# Patient Record
Sex: FEMALE | Race: WHITE | NOT HISPANIC OR LATINO | Employment: PART TIME | URBAN - NONMETROPOLITAN AREA
[De-identification: names, ages, dates, MRNs, and addresses within clinical notes are randomized per-mention and may not be internally consistent; named-entity substitution may affect disease eponyms.]

---

## 2017-10-04 ENCOUNTER — OFFICE VISIT (OUTPATIENT)
Dept: URGENT CARE | Facility: PHYSICIAN GROUP | Age: 37
End: 2017-10-04
Payer: COMMERCIAL

## 2017-10-04 VITALS
WEIGHT: 207 LBS | HEIGHT: 62 IN | OXYGEN SATURATION: 96 % | DIASTOLIC BLOOD PRESSURE: 70 MMHG | HEART RATE: 84 BPM | BODY MASS INDEX: 38.09 KG/M2 | TEMPERATURE: 98.8 F | RESPIRATION RATE: 18 BRPM | SYSTOLIC BLOOD PRESSURE: 118 MMHG

## 2017-10-04 DIAGNOSIS — H92.02 ACUTE OTALGIA, LEFT: ICD-10-CM

## 2017-10-04 PROCEDURE — 99213 OFFICE O/P EST LOW 20 MIN: CPT | Performed by: PHYSICIAN ASSISTANT

## 2017-10-04 ASSESSMENT — ENCOUNTER SYMPTOMS
SORE THROAT: 0
RHINORRHEA: 0
FEVER: 0
HEADACHES: 0
CHILLS: 0
COUGH: 0

## 2017-10-04 NOTE — PROGRESS NOTES
"Subjective:      Laurence Perez is a 37 y.o. female who presents with Otalgia (Pt states she woke up with L ear pain)            Left ear pain that started this morning. No fevers or other complaints.      Otalgia    There is pain in the left ear. This is a new problem. The current episode started today. The problem occurs constantly. The problem has been waxing and waning. There has been no fever. The pain is moderate. Pertinent negatives include no coughing, ear discharge, headaches, rhinorrhea or sore throat. She has tried NSAIDs for the symptoms. The treatment provided mild relief.       Review of Systems   Constitutional: Negative for chills and fever.   HENT: Positive for ear pain. Negative for congestion, ear discharge, rhinorrhea and sore throat.    Respiratory: Negative for cough.    Neurological: Negative for headaches.     Allergies:Amoxicillin    No current Epic-ordered outpatient prescriptions on file.     No current [facility]-ordered facility-administered medications on file.        No past medical history on file.    Social History   Substance Use Topics   • Smoking status: Never Smoker   • Smokeless tobacco: Never Used   • Alcohol use 1.0 oz/week     1 Glasses of wine, 1 Cans of beer per week      Comment: occ       No family status information on file.   History reviewed. No pertinent family history.       Objective:     /70   Pulse 84   Temp 37.1 °C (98.8 °F)   Resp 18   Ht 1.575 m (5' 2.01\")   Wt 93.9 kg (207 lb)   SpO2 96%   Breastfeeding? No   BMI 37.85 kg/m²      Physical Exam   Constitutional: She is oriented to person, place, and time. She appears well-developed and well-nourished. No distress.   HENT:   Head: Normocephalic and atraumatic.   Right Ear: External ear normal.   Left Ear: External ear normal.   Mouth/Throat: Oropharynx is clear and moist.   Canals unremarkable. Tympanic membrane slightly dull without erythema   Eyes: Right eye exhibits no discharge. Left eye exhibits " no discharge.   Neck: Normal range of motion. Neck supple.   Cardiovascular: Normal rate.    Pulmonary/Chest: Effort normal.   Neurological: She is alert and oriented to person, place, and time.   Skin: Skin is warm and dry. She is not diaphoretic.   Psychiatric: She has a normal mood and affect. Her behavior is normal. Judgment and thought content normal.   Nursing note and vitals reviewed.              Assessment/Plan:     1. Acute otalgia, left      Started this morning. Canals unremarkable. Tympanic membrane dull without erythema. Discussed symptomatic therapy. Follow-up with PCP as needed       [employer] Interactive Patient Education given:Otalgia    Please note that this dictation was created using voice recognition software. I have made every reasonable attempt to correct obvious errors, but I expect that there are errors of grammar and possibly content that I did not discover before finalizing the note.

## 2017-11-09 ENCOUNTER — OFFICE VISIT (OUTPATIENT)
Dept: URGENT CARE | Facility: PHYSICIAN GROUP | Age: 37
End: 2017-11-09
Payer: COMMERCIAL

## 2017-11-09 VITALS
HEIGHT: 62 IN | RESPIRATION RATE: 18 BRPM | OXYGEN SATURATION: 98 % | BODY MASS INDEX: 38.28 KG/M2 | WEIGHT: 208 LBS | SYSTOLIC BLOOD PRESSURE: 122 MMHG | HEART RATE: 114 BPM | DIASTOLIC BLOOD PRESSURE: 64 MMHG | TEMPERATURE: 98.8 F

## 2017-11-09 DIAGNOSIS — R05.9 COUGH: ICD-10-CM

## 2017-11-09 DIAGNOSIS — J06.9 UPPER RESPIRATORY TRACT INFECTION, UNSPECIFIED TYPE: ICD-10-CM

## 2017-11-09 PROCEDURE — 99214 OFFICE O/P EST MOD 30 MIN: CPT | Performed by: PHYSICIAN ASSISTANT

## 2017-11-09 RX ORDER — BENZONATATE 200 MG/1
200 CAPSULE ORAL 3 TIMES DAILY PRN
Qty: 30 CAP | Refills: 0 | Status: SHIPPED | OUTPATIENT
Start: 2017-11-09 | End: 2018-11-19

## 2017-11-09 ASSESSMENT — ENCOUNTER SYMPTOMS
COUGH: 1
CHILLS: 0
HEADACHES: 1
SHORTNESS OF BREATH: 0
SPUTUM PRODUCTION: 0
RHINORRHEA: 1
WHEEZING: 0
FEVER: 0

## 2017-11-09 NOTE — PROGRESS NOTES
"Subjective:      Laurence Perez is a 37 y.o. female who presents with Cough (Pt states she was seen 10/4/17 with little improvement)            Rhinorrhea, congestion, ear pressure/pain, and cough for the last couple of days. Symptoms fluctuating but not improving. No fevers or chills.      Cough   This is a new problem. The current episode started in the past 7 days. The problem has been waxing and waning. Associated symptoms include ear congestion, ear pain, headaches, nasal congestion and rhinorrhea. Pertinent negatives include no chills, fever, shortness of breath or wheezing. Nothing aggravates the symptoms. She has tried a beta-agonist inhaler for the symptoms. The treatment provided mild relief. Her past medical history is significant for bronchitis.       Review of Systems   Constitutional: Negative for chills and fever.   HENT: Positive for congestion, ear pain and rhinorrhea.    Respiratory: Positive for cough. Negative for sputum production, shortness of breath and wheezing.    Neurological: Positive for headaches.     Allergies:Amoxicillin    Current Outpatient Prescriptions Ordered in [facility]   Medication Sig Dispense Refill   • Fluticasone Propionate (FLONASE NA) Spray  in nose.     • Loratadine (CLARITIN PO) Take  by mouth.     • benzonatate (TESSALON) 200 MG capsule Take 1 Cap by mouth 3 times a day as needed for Cough. 30 Cap 0     No current Epic-ordered facility-administered medications on file.        History reviewed. No pertinent past medical history.    Social History   Substance Use Topics   • Smoking status: Never Smoker   • Smokeless tobacco: Never Used   • Alcohol use 1.0 oz/week     1 Glasses of wine, 1 Cans of beer per week      Comment: occ       No family status information on file.   History reviewed. No pertinent family history.       Objective:     /64   Pulse (!) 114   Temp 37.1 °C (98.8 °F)   Resp 18   Ht 1.575 m (5' 2.01\")   Wt 94.3 kg (208 lb)   SpO2 98%   " Breastfeeding? No   BMI 38.03 kg/m²      Physical Exam   Constitutional: She is oriented to person, place, and time. She appears well-developed and well-nourished. No distress.   HENT:   Head: Normocephalic and atraumatic.   Right Ear: External ear normal.   Left Ear: External ear normal.   Mouth/Throat: Oropharynx is clear and moist.   Canals unremarkable. Tympanic membranes dull without erythema. Nasal mucosal edema with clear nasal mucus   Eyes: Right eye exhibits no discharge. Left eye exhibits no discharge.   Neck: Normal range of motion. Neck supple.   Cardiovascular: Regular rhythm.    Tachycardic   Pulmonary/Chest: Effort normal and breath sounds normal. She has no wheezes. She has no rales.   Frequent nonproductive cough   Neurological: She is alert and oriented to person, place, and time.   Skin: Skin is warm and dry. She is not diaphoretic.   Psychiatric: She has a normal mood and affect. Her behavior is normal. Judgment and thought content normal.   Nursing note and vitals reviewed.              Assessment/Plan:     1. Upper respiratory tract infection, unspecified type      Ongoing for about a week. Likely viral. Given written instructions. Follow-up with PCP as needed   2. Cough  benzonatate (TESSALON) 200 MG capsule    Nonproductive. Lungs clear. Continue albuterol as needed. Start Tessalon. Follow-up with PCP       [employer] Interactive Patient Education given: DARINEL cough    Please note that this dictation was created using voice recognition software. I have made every reasonable attempt to correct obvious errors, but I expect that there are errors of grammar and possibly content that I did not discover before finalizing the note.

## 2017-11-09 NOTE — PATIENT INSTRUCTIONS
"Upper Respiratory Infection, Adult  Most upper respiratory infections (URIs) are a viral infection of the air passages leading to the lungs. A URI affects the nose, throat, and upper air passages. The most common type of URI is nasopharyngitis and is typically referred to as \"the common cold.\"  URIs run their course and usually go away on their own. Most of the time, a URI does not require medical attention, but sometimes a bacterial infection in the upper airways can follow a viral infection. This is called a secondary infection. Sinus and middle ear infections are common types of secondary upper respiratory infections.  Bacterial pneumonia can also complicate a URI. A URI can worsen asthma and chronic obstructive pulmonary disease (COPD). Sometimes, these complications can require emergency medical care and may be life threatening.   CAUSES  Almost all URIs are caused by viruses. A virus is a type of germ and can spread from one person to another.   RISKS FACTORS  You may be at risk for a URI if:   · You smoke.    · You have chronic heart or lung disease.  · You have a weakened defense (immune) system.    · You are very young or very old.    · You have nasal allergies or asthma.  · You work in crowded or poorly ventilated areas.  · You work in health care facilities or schools.  SIGNS AND SYMPTOMS   Symptoms typically develop 2-3 days after you come in contact with a cold virus. Most viral URIs last 7-10 days. However, viral URIs from the influenza virus (flu virus) can last 14-18 days and are typically more severe. Symptoms may include:   · Runny or stuffy (congested) nose.    · Sneezing.    · Cough.    · Sore throat.    · Headache.    · Fatigue.    · Fever.    · Loss of appetite.    · Pain in your forehead, behind your eyes, and over your cheekbones (sinus pain).  · Muscle aches.    DIAGNOSIS   Your health care provider may diagnose a URI by:  · Physical exam.  · Tests to check that your symptoms are not due to " another condition such as:  ¨ Strep throat.  ¨ Sinusitis.  ¨ Pneumonia.  ¨ Asthma.  TREATMENT   A URI goes away on its own with time. It cannot be cured with medicines, but medicines may be prescribed or recommended to relieve symptoms. Medicines may help:  · Reduce your fever.  · Reduce your cough.  · Relieve nasal congestion.  HOME CARE INSTRUCTIONS   · Take medicines only as directed by your health care provider.    · Gargle warm saltwater or take cough drops to comfort your throat as directed by your health care provider.  · Use a warm mist humidifier or inhale steam from a shower to increase air moisture. This may make it easier to breathe.  · Drink enough fluid to keep your urine clear or pale yellow.    · Eat soups and other clear broths and maintain good nutrition.    · Rest as needed.    · Return to work when your temperature has returned to normal or as your health care provider advises. You may need to stay home longer to avoid infecting others. You can also use a face mask and careful hand washing to prevent spread of the virus.  · Increase the usage of your inhaler if you have asthma.    · Do not use any tobacco products, including cigarettes, chewing tobacco, or electronic cigarettes. If you need help quitting, ask your health care provider.  PREVENTION   The best way to protect yourself from getting a cold is to practice good hygiene.   · Avoid oral or hand contact with people with cold symptoms.    · Wash your hands often if contact occurs.    There is no clear evidence that vitamin C, vitamin E, echinacea, or exercise reduces the chance of developing a cold. However, it is always recommended to get plenty of rest, exercise, and practice good nutrition.   SEEK MEDICAL CARE IF:   · You are getting worse rather than better.    · Your symptoms are not controlled by medicine.    · You have chills.  · You have worsening shortness of breath.  · You have brown or red mucus.  · You have yellow or brown nasal  discharge.  · You have pain in your face, especially when you bend forward.  · You have a fever.  · You have swollen neck glands.  · You have pain while swallowing.  · You have white areas in the back of your throat.  SEEK IMMEDIATE MEDICAL CARE IF:   · You have severe or persistent:  ¨ Headache.  ¨ Ear pain.  ¨ Sinus pain.  ¨ Chest pain.  · You have chronic lung disease and any of the following:  ¨ Wheezing.  ¨ Prolonged cough.  ¨ Coughing up blood.  ¨ A change in your usual mucus.  · You have a stiff neck.  · You have changes in your:  ¨ Vision.  ¨ Hearing.  ¨ Thinking.  ¨ Mood.  MAKE SURE YOU:   · Understand these instructions.  · Will watch your condition.  · Will get help right away if you are not doing well or get worse.     This information is not intended to replace advice given to you by your health care provider. Make sure you discuss any questions you have with your health care provider.     Document Released: 06/13/2002 Document Revised: 05/03/2016 Document Reviewed: 03/25/2015  [employer] Interactive Patient Education ©2016 [employer]  Cough, Adult   A cough is a reflex that helps clear your throat and airways. It can help heal the body or may be a reaction to an irritated airway. A cough may only last 2 or 3 weeks (acute) or may last more than 8 weeks (chronic).   CAUSES  Acute cough:  · Viral or bacterial infections.  Chronic cough:  · Infections.  · Allergies.  · Asthma.  · Post-nasal drip.  · Smoking.  · Heartburn or acid reflux.  · Some medicines.  · Chronic lung problems (COPD).  · Cancer.  SYMPTOMS   · Cough.  · Fever.  · Chest pain.  · Increased breathing rate.  · High-pitched whistling sound when breathing (wheezing).  · Colored mucus that you cough up (sputum).  TREATMENT   · A bacterial cough may be treated with antibiotic medicine.  · A viral cough must run its course and will not respond to antibiotics.  · Your caregiver may recommend other treatments if you have a chronic cough.  HOME  CARE INSTRUCTIONS   · Only take over-the-counter or prescription medicines for pain, discomfort, or fever as directed by your caregiver. Use cough suppressants only as directed by your caregiver.  · Use a cold steam vaporizer or humidifier in your bedroom or home to help loosen secretions.  · Sleep in a semi-upright position if your cough is worse at night.  · Rest as needed.  · Stop smoking if you smoke.  SEEK IMMEDIATE MEDICAL CARE IF:   · You have pus in your sputum.  · Your cough starts to worsen.  · You cannot control your cough with suppressants and are losing sleep.  · You begin coughing up blood.  · You have difficulty breathing.  · You develop pain which is getting worse or is uncontrolled with medicine.  · You have a fever.  MAKE SURE YOU:   · Understand these instructions.  · Will watch your condition.  · Will get help right away if you are not doing well or get worse.     This information is not intended to replace advice given to you by your health care provider. Make sure you discuss any questions you have with your health care provider.     Document Released: 06/15/2012 Document Revised: 03/11/2013 Document Reviewed: 02/24/2016  [employer] Interactive Patient Education ©2016 [employer]

## 2018-01-02 ENCOUNTER — OFFICE VISIT (OUTPATIENT)
Dept: URGENT CARE | Facility: PHYSICIAN GROUP | Age: 38
End: 2018-01-02
Payer: COMMERCIAL

## 2018-01-02 VITALS
RESPIRATION RATE: 14 BRPM | WEIGHT: 209 LBS | HEIGHT: 62 IN | OXYGEN SATURATION: 98 % | TEMPERATURE: 98 F | BODY MASS INDEX: 38.46 KG/M2 | SYSTOLIC BLOOD PRESSURE: 130 MMHG | DIASTOLIC BLOOD PRESSURE: 88 MMHG | HEART RATE: 84 BPM

## 2018-01-02 DIAGNOSIS — E66.9 OBESITY (BMI 30-39.9): ICD-10-CM

## 2018-01-02 DIAGNOSIS — B00.1 COLD SORE: Primary | ICD-10-CM

## 2018-01-02 PROCEDURE — 99214 OFFICE O/P EST MOD 30 MIN: CPT | Performed by: PHYSICIAN ASSISTANT

## 2018-01-02 RX ORDER — VALACYCLOVIR HYDROCHLORIDE 1 G/1
1000 TABLET, FILM COATED ORAL 2 TIMES DAILY
Qty: 2 TAB | Refills: 0 | Status: SHIPPED | OUTPATIENT
Start: 2018-01-02 | End: 2018-01-03

## 2018-01-02 NOTE — PROGRESS NOTES
"Chief Complaint   Patient presents with   • Mouth Lesions       HISTORY OF PRESENT ILLNESS: Patient is a 37 y.o. female who presents today because She has a cold sore on her upper lip on the right side. This is just in last couple days, she has had a few of them recently, but remembers getting them as a child. She has been using some over-the-counter ointment on it without improvement    Patient Active Problem List    Diagnosis Date Noted   • Obesity (BMI 30-39.9) 01/02/2018       Allergies:Amoxicillin    Current Outpatient Prescriptions Ordered in [facility]   Medication Sig Dispense Refill   • valacyclovir (VALTREX) 1 GM Tab Take 1 Tab by mouth 2 times a day for 1 day. 2 Tab 0   • Fluticasone Propionate (FLONASE NA) Spray  in nose.     • Loratadine (CLARITIN PO) Take  by mouth.     • benzonatate (TESSALON) 200 MG capsule Take 1 Cap by mouth 3 times a day as needed for Cough. 30 Cap 0     No current Epic-ordered facility-administered medications on file.        No past medical history on file.    Social History   Substance Use Topics   • Smoking status: Never Smoker   • Smokeless tobacco: Never Used   • Alcohol use 1.0 oz/week     1 Glasses of wine, 1 Cans of beer per week      Comment: occ       No family status information on file.   No family history on file.    ROS:  Review of Systems   Constitutional: Negative for fever, chills, weight loss and malaise/fatigue.   HENT: Negative for ear pain, nosebleeds, congestion, sore throat and neck pain.    Eyes: Negative for blurred vision.   Respiratory: Negative for cough, sputum production, shortness of breath and wheezing.    Cardiovascular: Negative for chest pain, palpitations, orthopnea and leg swelling.   Gastrointestinal: Negative for heartburn, nausea, vomiting and abdominal pain.       Exam:  Blood pressure 130/88, pulse 84, temperature 36.7 °C (98 °F), resp. rate 14, height 1.575 m (5' 2\"), weight 94.8 kg (209 lb), SpO2 98 %.  General:  Well nourished, well " developed female in NAD  Head:Normocephalic, atraumatic  Eyes: PERRLA, EOM within normal limits, no conjunctival injection, no scleral icterus, visual fields and acuity grossly intact.  Ears: Normal shape and symmetry, no tenderness, no discharge. External canals are without any significant edema or erythema. Tympanic membranes are without any inflammation, no effusion. Gross auditory acuity is intact  Nose: Symmetrical without tenderness, no discharge.  Mouth: reasonable hygiene, no erythema exudates or tonsillar enlargement.right upper vermilion border has a 1 cm diameter erythematous macular papular lesion consistent with a cold sore  Neck: no masses, range of motion within normal limits, no tracheal deviation. No obvious thyroid enlargement.  Extremities: no clubbing, cyanosis, or edema.    Please note that this dictation was created using voice recognition software. I have made every reasonable attempt to correct obvious errors, but I expect that there are errors of grammar and possibly content that I did not discover before finalizing the note.    Assessment/Plan:  1. Cold sore  valacyclovir (VALTREX) 1 GM Tab   2. Obesity (BMI 30-39.9)  Patient identified as having weight management issue.  Appropriate orders and counseling given.       Followup with primary care in the next 7-10 days if not significantly improving, return to the urgent care or go to the emergency room sooner for any worsening of symptoms.

## 2018-02-02 ENCOUNTER — HOSPITAL ENCOUNTER (EMERGENCY)
Facility: MEDICAL CENTER | Age: 38
End: 2018-02-02
Attending: EMERGENCY MEDICINE
Payer: COMMERCIAL

## 2018-02-02 ENCOUNTER — APPOINTMENT (OUTPATIENT)
Dept: RADIOLOGY | Facility: MEDICAL CENTER | Age: 38
End: 2018-02-02
Attending: EMERGENCY MEDICINE
Payer: COMMERCIAL

## 2018-02-02 VITALS
TEMPERATURE: 97.3 F | BODY MASS INDEX: 38.46 KG/M2 | WEIGHT: 209 LBS | HEIGHT: 62 IN | RESPIRATION RATE: 16 BRPM | OXYGEN SATURATION: 98 % | HEART RATE: 95 BPM | DIASTOLIC BLOOD PRESSURE: 72 MMHG | SYSTOLIC BLOOD PRESSURE: 125 MMHG

## 2018-02-02 DIAGNOSIS — R51.9 NONINTRACTABLE HEADACHE, UNSPECIFIED CHRONICITY PATTERN, UNSPECIFIED HEADACHE TYPE: ICD-10-CM

## 2018-02-02 LAB
ALBUMIN SERPL BCP-MCNC: 3.8 G/DL (ref 3.2–4.9)
ALBUMIN/GLOB SERPL: 1.2 G/DL
ALP SERPL-CCNC: 59 U/L (ref 30–99)
ALT SERPL-CCNC: 12 U/L (ref 2–50)
ANION GAP SERPL CALC-SCNC: 6 MMOL/L (ref 0–11.9)
APTT PPP: 32.1 SEC (ref 24.7–36)
AST SERPL-CCNC: 14 U/L (ref 12–45)
BASOPHILS # BLD AUTO: 0.7 % (ref 0–1.8)
BASOPHILS # BLD: 0.07 K/UL (ref 0–0.12)
BILIRUB SERPL-MCNC: 0.5 MG/DL (ref 0.1–1.5)
BUN SERPL-MCNC: 11 MG/DL (ref 8–22)
CALCIUM SERPL-MCNC: 8.8 MG/DL (ref 8.5–10.5)
CHLORIDE SERPL-SCNC: 110 MMOL/L (ref 96–112)
CO2 SERPL-SCNC: 22 MMOL/L (ref 20–33)
CREAT SERPL-MCNC: 0.8 MG/DL (ref 0.5–1.4)
EOSINOPHIL # BLD AUTO: 0.18 K/UL (ref 0–0.51)
EOSINOPHIL NFR BLD: 1.9 % (ref 0–6.9)
ERYTHROCYTE [DISTWIDTH] IN BLOOD BY AUTOMATED COUNT: 38.8 FL (ref 35.9–50)
GLOBULIN SER CALC-MCNC: 3.3 G/DL (ref 1.9–3.5)
GLUCOSE SERPL-MCNC: 96 MG/DL (ref 65–99)
HCT VFR BLD AUTO: 40.7 % (ref 37–47)
HGB BLD-MCNC: 13.9 G/DL (ref 12–16)
IMM GRANULOCYTES # BLD AUTO: 0.03 K/UL (ref 0–0.11)
IMM GRANULOCYTES NFR BLD AUTO: 0.3 % (ref 0–0.9)
INR PPP: 0.92 (ref 0.87–1.13)
LYMPHOCYTES # BLD AUTO: 3 K/UL (ref 1–4.8)
LYMPHOCYTES NFR BLD: 31.6 % (ref 22–41)
MCH RBC QN AUTO: 29.5 PG (ref 27–33)
MCHC RBC AUTO-ENTMCNC: 34.2 G/DL (ref 33.6–35)
MCV RBC AUTO: 86.4 FL (ref 81.4–97.8)
MONOCYTES # BLD AUTO: 0.51 K/UL (ref 0–0.85)
MONOCYTES NFR BLD AUTO: 5.4 % (ref 0–13.4)
NEUTROPHILS # BLD AUTO: 5.71 K/UL (ref 2–7.15)
NEUTROPHILS NFR BLD: 60.1 % (ref 44–72)
NRBC # BLD AUTO: 0 K/UL
NRBC BLD-RTO: 0 /100 WBC
PLATELET # BLD AUTO: 275 K/UL (ref 164–446)
PMV BLD AUTO: 10.7 FL (ref 9–12.9)
POTASSIUM SERPL-SCNC: 4.3 MMOL/L (ref 3.6–5.5)
PROT SERPL-MCNC: 7.1 G/DL (ref 6–8.2)
PROTHROMBIN TIME: 12.1 SEC (ref 12–14.6)
RBC # BLD AUTO: 4.71 M/UL (ref 4.2–5.4)
SODIUM SERPL-SCNC: 138 MMOL/L (ref 135–145)
WBC # BLD AUTO: 9.5 K/UL (ref 4.8–10.8)

## 2018-02-02 PROCEDURE — 85610 PROTHROMBIN TIME: CPT

## 2018-02-02 PROCEDURE — 70450 CT HEAD/BRAIN W/O DYE: CPT

## 2018-02-02 PROCEDURE — 85025 COMPLETE CBC W/AUTO DIFF WBC: CPT

## 2018-02-02 PROCEDURE — 96375 TX/PRO/DX INJ NEW DRUG ADDON: CPT

## 2018-02-02 PROCEDURE — 85730 THROMBOPLASTIN TIME PARTIAL: CPT

## 2018-02-02 PROCEDURE — 80053 COMPREHEN METABOLIC PANEL: CPT

## 2018-02-02 PROCEDURE — 99284 EMERGENCY DEPT VISIT MOD MDM: CPT

## 2018-02-02 PROCEDURE — 96374 THER/PROPH/DIAG INJ IV PUSH: CPT

## 2018-02-02 PROCEDURE — 700111 HCHG RX REV CODE 636 W/ 250 OVERRIDE (IP): Performed by: EMERGENCY MEDICINE

## 2018-02-02 RX ORDER — ONDANSETRON 2 MG/ML
4 INJECTION INTRAMUSCULAR; INTRAVENOUS ONCE
Status: COMPLETED | OUTPATIENT
Start: 2018-02-02 | End: 2018-02-02

## 2018-02-02 RX ORDER — HYDROMORPHONE HYDROCHLORIDE 2 MG/ML
1 INJECTION, SOLUTION INTRAMUSCULAR; INTRAVENOUS; SUBCUTANEOUS ONCE
Status: COMPLETED | OUTPATIENT
Start: 2018-02-02 | End: 2018-02-02

## 2018-02-02 RX ADMIN — HYDROMORPHONE HYDROCHLORIDE 1 MG: 2 INJECTION, SOLUTION INTRAMUSCULAR; INTRAVENOUS; SUBCUTANEOUS at 10:36

## 2018-02-02 RX ADMIN — ONDANSETRON 4 MG: 2 INJECTION INTRAMUSCULAR; INTRAVENOUS at 10:36

## 2018-02-02 ASSESSMENT — PAIN SCALES - GENERAL: PAINLEVEL_OUTOF10: 8

## 2018-02-02 NOTE — ED NOTES
"Pt brought back from [facility], ambulatory with steady gait.     Pt c/o HA x 4 days. 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 +photophobia.     A/o x4, speaking in full sentences.   "

## 2018-02-02 NOTE — ED TRIAGE NOTES
"PT ambulated to triage c/o headache x 4 days, pt has hx of migraines, pt reports seeing \"mirror like images off and on\"  Chief Complaint   Patient presents with   • Head Ache     hx of migraines      Blood pressure 132/74, pulse 99, temperature 36.1 °C (97 °F), resp. rate 18, height 1.575 m (5' 2\"), weight 94.8 kg (208 lb 15.9 oz), SpO2 98 %.      "

## 2018-02-02 NOTE — ED PROVIDER NOTES
"ED Provider Note    CHIEF COMPLAINT  Chief Complaint   Patient presents with   • Head Ache     hx of migraines        HPI  Laurence Perez is a 37 y.o. female who presents complaining of an intermittent bifrontal headache with photophobia over the past several days. She had an evaluation for migraines about 15 years ago. Evaluation was normal. She has not had any migraines recently. She does have some type of clotting disorder so she wants an evaluation. That certainly understandable. No stiff neck or fever. No weakness in the arms or legs. No speech difficulty. No memory loss.    REVIEW OF SYSTEMS  No chest pain or difficulty breathing. No abdominal pain.  ALL OTHER SYSTEMS NEGATIVE    ALLERGIES  Allergies   Allergen Reactions   • Amoxicillin        CURRENT MEDICATIONS  Home Medications     Reviewed by Navya Alvarado R.N. (Registered Nurse) on 02/02/18 at 0955  Med List Status: Partial   Medication Last Dose Status   benzonatate (TESSALON) 200 MG capsule  Active   Fluticasone Propionate (FLONASE NA)  Active   Loratadine (CLARITIN PO)  Active                PAST MEDICAL HISTORY  Negative    FAMILY HISTORY  Negative    SOCIAL HISTORY  Negative    PHYSICAL EXAM  GENERAL: Alert photophobic female adult  VITAL SIGNS: /74   Pulse 99   Temp 36.1 °C (97 °F)   Resp 18   Ht 1.575 m (5' 2\")   Wt 94.8 kg (208 lb 15.9 oz)   SpO2 98%   BMI 38.23 kg/m²   Constitutional: Alert healthy-appearing adult   HENT: Scalp is normal size and nontender. Ears are clear. Nose is clear. Throat is clear with no stridor no drooling no trismus. Teeth are all intact.  Eyes: Pupils equal round and reactive to light, extraocular motor fall. There is no scleral icterus.  Neck: Neck is supple and nontender. There is no meningismus. No adenitis. No thyromegaly.  Lymphatic: No adenopathy.   Cardiovascular: Heart regular rhythm without murmurs or gallops   Thorax & Lungs: No chest wall tenderness. Lungs are clear. Patient has good " breath sounds bilateral. No rales, no rhonchi, no wheezes.  Skin: Warm, pink, and dry with no erythema and no rash.   Back: Nontender, no midline bony tenderness, no flank tenderness.                                            Extremities: Full range of motion  No tenderness to palpation and no deformities noted. No calf or thigh swelling. No calf or thigh tenderness. No clinical DVT.  Neurologic: Alert & oriented . Cranial nerves are grossly intact as tested. Patient moves all 4 extremities well. Patient has good strong flexion and extension of the ankle joints knee joints hip joints and elbow joints. Sensation is normal and symmetrical in the upper and lower extremities.   Psychiatric: Patient is alert oriented coherent and rational.     RADIOLOGY/PROCEDURES  CT-HEAD W/O   Final Result      1.  No evidence of acute intracranial process.      2.  Chronic bilateral ethmoid sinusitis.            COURSE & MEDICAL DECISION MAKING  Patient is a history of migraines. But she has not had an evaluation and about 15 years. 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 Evaluation will be completed with lab and CAT scan.    Plan: #1 IV #2 intravenous Zofran and Dilaudid No. 3. CT of the head #4. Lab included CBC, CMP, pro time and PTT.    Laboratory and CT and reevaluation: CT of the head shows no acute change. Chronic sinusitis. CBC is normal with no anemia and no bandemia. Chemistry panel was normal with no renal or liver dysfunction. Pro time and PTT both normal. Stable for discharge.    Results for orders placed or performed during the hospital encounter of 02/02/18   CBC WITH DIFFERENTIAL   Result Value Ref Range    WBC 9.5 4.8 - 10.8 K/uL    RBC 4.71 4.20 - 5.40 M/uL    Hemoglobin 13.9 12.0 - 16.0 g/dL    Hematocrit 40.7 37.0 - 47.0 %    MCV 86.4 81.4 - 97.8 fL    MCH 29.5 27.0 - 33.0 pg    MCHC 34.2 33.6 - 35.0 g/dL    RDW 38.8 35.9 - 50.0 fL    Platelet Count 275 164 - 446 K/uL    MPV 10.7 9.0 - 12.9 fL     Neutrophils-Polys 60.10 44.00 - 72.00 %    Lymphocytes 31.60 22.00 - 41.00 %    Monocytes 5.40 0.00 - 13.40 %    Eosinophils 1.90 0.00 - 6.90 %    Basophils 0.70 0.00 - 1.80 %    Immature Granulocytes 0.30 0.00 - 0.90 %    Nucleated RBC 0.00 /100 WBC    Neutrophils (Absolute) 5.71 2.00 - 7.15 K/uL    Lymphs (Absolute) 3.00 1.00 - 4.80 K/uL    Monos (Absolute) 0.51 0.00 - 0.85 K/uL    Eos (Absolute) 0.18 0.00 - 0.51 K/uL    Baso (Absolute) 0.07 0.00 - 0.12 K/uL    Immature Granulocytes (abs) 0.03 0.00 - 0.11 K/uL    NRBC (Absolute) 0.00 K/uL   COMP METABOLIC PANEL   Result Value Ref Range    Sodium 138 135 - 145 mmol/L    Potassium 4.3 3.6 - 5.5 mmol/L    Chloride 110 96 - 112 mmol/L    Co2 22 20 - 33 mmol/L    Anion Gap 6.0 0.0 - 11.9    Glucose 96 65 - 99 mg/dL    Bun 11 8 - 22 mg/dL    Creatinine 0.80 0.50 - 1.40 mg/dL    Calcium 8.8 8.5 - 10.5 mg/dL    AST(SGOT) 14 12 - 45 U/L    ALT(SGPT) 12 2 - 50 U/L    Alkaline Phosphatase 59 30 - 99 U/L    Total Bilirubin 0.5 0.1 - 1.5 mg/dL    Albumin 3.8 3.2 - 4.9 g/dL    Total Protein 7.1 6.0 - 8.2 g/dL    Globulin 3.3 1.9 - 3.5 g/dL    A-G Ratio 1.2 g/dL   PROTHROMBIN TIME   Result Value Ref Range    PT 12.1 12.0 - 14.6 sec    INR 0.92 0.87 - 1.13   APTT   Result Value Ref Range    APTT 32.1 24.7 - 36.0 sec   ESTIMATED GFR   Result Value Ref Range    GFR If African American >60 >60 mL/min/1.73 m 2    GFR If Non African American >60 >60 mL/min/1.73 m 2      Patient has a long history of migraine headaches. Her evaluation here today is negative and normal. She responded well to medication here.    Home treatment: #1 and given a copy of all of her lab results and CAT scan reports. #2 show follow-up with her family physician #3 she stable on discharge. #4 she was given instructions on migraine headaches.  FINAL IMPRESSION  1. Headache  2. Migraine headache         Electronically signed by: Gary Gansert, 2/2/2018 10:20 AM

## 2018-02-02 NOTE — DISCHARGE INSTRUCTIONS
General Headache Without Cause  A general headache is pain or discomfort felt around the head or neck area. The cause may not be found.   HOME CARE   · Keep all doctor visits.  · Only take medicines as told by your doctor.  · Lie down in a dark, quiet room when you have a headache.  · Keep a journal to find out if certain things bring on headaches. For example, write down:  ¨ What you eat and drink.  ¨ How much sleep you get.  ¨ Any change to your diet or medicines.  · Relax by getting a massage or doing other relaxing activities.  · Put ice or heat packs on the head and neck area as told by your doctor.  · Lessen stress.  · Sit up straight. Do not tighten (tense) your muscles.  · Quit smoking if you smoke.  · Lessen how much alcohol you drink.  · Lessen how much caffeine you drink, or stop drinking caffeine.  · Eat and sleep on a regular schedule.  · Get 7 to 9 hours of sleep, or as told by your doctor.  · Keep lights dim if bright lights bother you or make your headaches worse.  GET HELP RIGHT AWAY IF:   · Your headache becomes really bad.  · You have a fever.  · You have a stiff neck.  · You have trouble seeing.  · Your muscles are weak, or you lose muscle control.  · You lose your balance or have trouble walking.  · You feel like you will pass out (faint), or you pass out.  · You have really bad symptoms that are different than your first symptoms.  · You have problems with the medicines given to you by your doctor.  · Your medicines do not work.  · Your headache feels different than the other headaches.  · You feel sick to your stomach (nauseous) or throw up (vomit).     This information is not intended to replace advice given to you by your health care provider. Make sure you discuss any questions you have with your health care provider.     Document Released: 09/26/2009 Document Revised: 05/03/2016 Document Reviewed: 12/07/2012  Elsevier Interactive Patient Education ©2016 [employer]      Migraine  Headache  A migraine headache is very bad, throbbing pain on one or both sides of your head. Talk to your doctor about what things may bring on (trigger) your migraine headaches.  HOME CARE  · Only take medicines as told by your doctor.  · Lie down in a dark, quiet room when you have a migraine.  · Keep a journal to find out if certain things bring on migraine headaches. For example, write down:  ¨ What you eat and drink.  ¨ How much sleep you get.  ¨ Any change to your diet or medicines.  · Lessen how much alcohol you drink.  · Quit smoking if you smoke.  · Get enough sleep.  · Lessen any stress in your life.  · Keep lights dim if bright lights bother you or make your migraines worse.  GET HELP RIGHT AWAY IF:   · Your migraine becomes really bad.  · You have a fever.  · You have a stiff neck.  · You have trouble seeing.  · Your muscles are weak, or you lose muscle control.  · You lose your balance or have trouble walking.  · You feel like you will pass out (faint), or you pass out.  · You have really bad symptoms that are different than your first symptoms.  MAKE SURE YOU:   · Understand these instructions.  · Will watch your condition.  · Will get help right away if you are not doing well or get worse.     This information is not intended to replace advice given to you by your health care provider. Make sure you discuss any questions you have with your health care provider.     Document Released: 09/26/2009 Document Revised: 03/11/2013 Document Reviewed: 08/25/2014  [employer] Interactive Patient Education ©2016 [employer]

## 2018-05-25 ENCOUNTER — APPOINTMENT (OUTPATIENT)
Dept: RADIOLOGY | Facility: IMAGING CENTER | Age: 38
End: 2018-05-25
Attending: PHYSICIAN ASSISTANT
Payer: COMMERCIAL

## 2018-05-25 ENCOUNTER — OFFICE VISIT (OUTPATIENT)
Dept: URGENT CARE | Facility: PHYSICIAN GROUP | Age: 38
End: 2018-05-25
Payer: COMMERCIAL

## 2018-05-25 VITALS
TEMPERATURE: 97.9 F | HEIGHT: 62 IN | BODY MASS INDEX: 37.54 KG/M2 | SYSTOLIC BLOOD PRESSURE: 124 MMHG | HEART RATE: 78 BPM | OXYGEN SATURATION: 98 % | RESPIRATION RATE: 16 BRPM | DIASTOLIC BLOOD PRESSURE: 78 MMHG | WEIGHT: 204 LBS

## 2018-05-25 DIAGNOSIS — M77.31 HEEL SPUR, RIGHT: ICD-10-CM

## 2018-05-25 DIAGNOSIS — M79.671 RIGHT FOOT PAIN: ICD-10-CM

## 2018-05-25 PROCEDURE — 73630 X-RAY EXAM OF FOOT: CPT | Mod: TC,FY,RT | Performed by: PHYSICIAN ASSISTANT

## 2018-05-25 PROCEDURE — 99214 OFFICE O/P EST MOD 30 MIN: CPT | Performed by: PHYSICIAN ASSISTANT

## 2018-05-25 RX ORDER — METHYLPREDNISOLONE 4 MG/1
TABLET ORAL
Qty: 21 TAB | Refills: 0 | Status: SHIPPED | OUTPATIENT
Start: 2018-05-25 | End: 2018-11-19

## 2018-05-25 ASSESSMENT — ENCOUNTER SYMPTOMS
SENSORY CHANGE: 0
NAUSEA: 0
WEAKNESS: 0
ARTHRALGIAS: 1
MYALGIAS: 1
NUMBNESS: 0
TINGLING: 0
CHILLS: 0
JOINT SWELLING: 1
FEVER: 0

## 2018-05-25 ASSESSMENT — PAIN SCALES - GENERAL: PAINLEVEL: 8=MODERATE-SEVERE PAIN

## 2018-05-26 NOTE — PROGRESS NOTES
"Subjective:      Laurence Perez is a 37 y.o. female who presents with Foot Pain (more in her heel its been over a week but today is worse)            Foot Problem   This is a new problem. Episode onset: 1 week  The problem occurs constantly. The problem has been waxing and waning (worsening today after landing on her foot after standing on her tip toes). Associated symptoms include arthralgias (right foot pain ), joint swelling and myalgias. Pertinent negatives include no chills, fever, nausea, numbness, rash or weakness. Exacerbated by: bearing weight. She has tried acetaminophen for the symptoms.       History reviewed. No pertinent past medical history.    History reviewed. No pertinent surgical history.    History reviewed. No pertinent family history.    Allergies   Allergen Reactions   • Amoxicillin        Medications, Allergies, and current problem list reviewed today in Epic    Review of Systems   Constitutional: Negative for chills and fever.   Gastrointestinal: Negative for nausea.   Musculoskeletal: Positive for arthralgias (right foot pain ), joint pain (right foot pain ), joint swelling and myalgias.   Skin: Negative for rash.   Neurological: Negative for tingling, sensory change, weakness and numbness.     All other systems reviewed and are negative.        Objective:     /78   Pulse 78   Temp 36.6 °C (97.9 °F)   Resp 16   Ht 1.575 m (5' 2\")   Wt 92.5 kg (204 lb)   SpO2 98%   BMI 37.31 kg/m²      Physical Exam   Constitutional: She is oriented to person, place, and time. She appears well-developed and well-nourished. No distress.   Pulmonary/Chest: Effort normal. No respiratory distress.   Musculoskeletal:        Feet:    Neurological: She is alert and oriented to person, place, and time.   Psychiatric: She has a normal mood and affect. Her behavior is normal. Judgment and thought content normal.                 RAD:  FINDINGS:  No acute fracture, malalignment, or soft tissue " abnormality. There is a bifid medial sesamoid bone. There is a tiny enthesophyte projecting from the plantar surface of calcaneus    Assessment/Plan:     1. Right foot pain  DX-FOOT-COMPLETE 3+ RIGHT    MethylPREDNISolone (MEDROL DOSEPAK) 4 MG Tablet Therapy Pack   2. Heel spur, right  MethylPREDNISolone (MEDROL DOSEPAK) 4 MG Tablet Therapy Pack       - DX-FOOT-COMPLETE 3+ RIGHT; Future      Current Outpatient Prescriptions:   •  MethylPREDNISolone (MEDROL DOSEPAK) 4 MG Tablet Therapy Pack, Take as directed on package. 1 pack., Disp: 21 Tab, Rfl: 0    Encouraged RICE.  Encouraged comfortable shoes.  Patient refuses Boot and podiatry referrla.     Differential diagnoses, Supportive care, and indications for immediate follow-up discussed with patient.   Instructed to return to clinic or nearest emergency department for any change in condition, further concerns, or worsening of symptoms.    The patient demonstrated a good understanding and agreed with the treatment plan.    Luisa Gaston P.A.-C.

## 2018-11-19 ENCOUNTER — OFFICE VISIT (OUTPATIENT)
Dept: URGENT CARE | Facility: PHYSICIAN GROUP | Age: 38
End: 2018-11-19
Payer: COMMERCIAL

## 2018-11-19 VITALS
TEMPERATURE: 98.2 F | HEART RATE: 96 BPM | DIASTOLIC BLOOD PRESSURE: 86 MMHG | RESPIRATION RATE: 16 BRPM | BODY MASS INDEX: 38.64 KG/M2 | OXYGEN SATURATION: 99 % | SYSTOLIC BLOOD PRESSURE: 122 MMHG | HEIGHT: 62 IN | WEIGHT: 210 LBS

## 2018-11-19 DIAGNOSIS — H69.91 DYSFUNCTION OF RIGHT EUSTACHIAN TUBE: ICD-10-CM

## 2018-11-19 PROCEDURE — 99213 OFFICE O/P EST LOW 20 MIN: CPT | Performed by: FAMILY MEDICINE

## 2018-11-19 RX ORDER — OXYMETAZOLINE HYDROCHLORIDE 0.05 G/100ML
2 SPRAY NASAL 2 TIMES DAILY
Qty: 1 BOTTLE | Refills: 0 | Status: SHIPPED | OUTPATIENT
Start: 2018-11-19

## 2018-11-19 RX ORDER — FLUTICASONE PROPIONATE 50 MCG
1 SPRAY, SUSPENSION (ML) NASAL DAILY
Qty: 16 G | Refills: 0 | Status: SHIPPED | OUTPATIENT
Start: 2018-11-19

## 2018-11-19 ASSESSMENT — ENCOUNTER SYMPTOMS
COUGH: 0
FEVER: 0
DIARRHEA: 0
SHORTNESS OF BREATH: 0
HEADACHES: 0
ABDOMINAL PAIN: 0
NAUSEA: 0
SORE THROAT: 0
VOMITING: 0
CHILLS: 0

## 2018-11-19 NOTE — PROGRESS NOTES
"Subjective:     Laurence Perez is a 38 y.o. female who presents for Sinus Problem    HPI  Pt presents for evaluation of a new problem  Pt with sinus pain for the past week  Right ear is starting to become painful last 2 days and concerned that she may have developed an ear infection   Uses Flonase daily and also takes Claritin daily  Ear pain is sharp, constantly present, and worsening  Denies associated decreased hearing acuity, tinnitus, or ear drainage  Has been taking Sudafed and Tylenol with some improvement   Denies sick contacts with similar symptoms    Review of Systems   Constitutional: Negative for chills and fever.   HENT: Positive for congestion and ear pain. Negative for sore throat.    Respiratory: Negative for cough and shortness of breath.    Cardiovascular: Negative for chest pain.   Gastrointestinal: Negative for abdominal pain, diarrhea, nausea and vomiting.   Skin: Negative for rash.   Neurological: Negative for headaches.     PMH:  has no past medical history on file.  MEDS:   Current Outpatient Prescriptions:   •  MethylPREDNISolone (MEDROL DOSEPAK) 4 MG Tablet Therapy Pack, Take as directed on package. 1 pack., Disp: 21 Tab, Rfl: 0  •  Fluticasone Propionate (FLONASE NA), Spray  in nose., Disp: , Rfl:   •  Loratadine (CLARITIN PO), Take  by mouth., Disp: , Rfl:   •  benzonatate (TESSALON) 200 MG capsule, Take 1 Cap by mouth 3 times a day as needed for Cough., Disp: 30 Cap, Rfl: 0  ALLERGIES:   Allergies   Allergen Reactions   • Amoxicillin      SURGHX: No past surgical history on file.  SOCHX:  reports that she has never smoked. She has never used smokeless tobacco. She reports that she drinks about 1.0 oz of alcohol per week . She reports that she does not use drugs.     Objective:   /86 (BP Location: Right arm, Patient Position: Sitting, BP Cuff Size: Large adult)   Pulse 96   Temp 36.8 °C (98.2 °F) (Temporal)   Resp 16   Ht 1.575 m (5' 2\")   Wt 95.3 kg (210 lb)   SpO2 99%   " BMI 38.41 kg/m²     Physical Exam   Constitutional: She appears well-developed and well-nourished. No distress.   HENT:   Head: Normocephalic and atraumatic.   Right Ear: External ear and ear canal normal.   Left Ear: External ear and ear canal normal.   Nose: Mucosal edema and rhinorrhea present.   Mouth/Throat: Uvula is midline, oropharynx is clear and moist and mucous membranes are normal.   Bilateral tympanic membranes with clear effusions   Eyes: Conjunctivae and EOM are normal. Right eye exhibits no discharge. Left eye exhibits no discharge. No scleral icterus.   Neck: Normal range of motion. No tracheal deviation present.   Cardiovascular: Normal rate, regular rhythm and normal heart sounds.    Pulmonary/Chest: Effort normal and breath sounds normal. No respiratory distress. She has no wheezes. She has no rales.   Neurological: She is alert. Coordination normal.   Skin: Skin is warm and dry. No rash noted. She is not diaphoretic.   Psychiatric: She has a normal mood and affect.          Assessment/Plan:   Assessment    1. Dysfunction of right eustachian tube  Patient with ear pain secondary to eustachian tube dysfunction.  Tympanic membrane not consistent with otitis media.  Already using Flonase daily as well as Claritin.  Advised adding Afrin for a few days and also using alkalol nasal spray for moisture.  Antibiotics not indicated at this time.  Reviewed other supportive care measures as well as expected course of illness.  Will follow-up on an as-needed basis.  - fluticasone (FLONASE) 50 MCG/ACT nasal spray; Spray 1 Spray in nose every day.  Dispense: 16 g; Refill: 0  - oxymetazoline (AFRIN) 0.05 % Solution; Spray 2 Sprays in nose 2 times a day.  Dispense: 1 Bottle; Refill: 0    F/U PRN

## 2019-03-01 ENCOUNTER — OFFICE VISIT (OUTPATIENT)
Dept: URGENT CARE | Facility: PHYSICIAN GROUP | Age: 39
End: 2019-03-01
Payer: COMMERCIAL

## 2019-03-01 VITALS
HEIGHT: 62 IN | DIASTOLIC BLOOD PRESSURE: 78 MMHG | OXYGEN SATURATION: 98 % | WEIGHT: 215 LBS | SYSTOLIC BLOOD PRESSURE: 118 MMHG | BODY MASS INDEX: 39.56 KG/M2 | RESPIRATION RATE: 14 BRPM | HEART RATE: 100 BPM | TEMPERATURE: 98.5 F

## 2019-03-01 DIAGNOSIS — L02.411 ABSCESS OF AXILLA, RIGHT: ICD-10-CM

## 2019-03-01 PROCEDURE — 99214 OFFICE O/P EST MOD 30 MIN: CPT | Performed by: PHYSICIAN ASSISTANT

## 2019-03-01 RX ORDER — SULFAMETHOXAZOLE AND TRIMETHOPRIM 800; 160 MG/1; MG/1
1 TABLET ORAL 2 TIMES DAILY
Qty: 20 TAB | Refills: 0 | Status: SHIPPED | OUTPATIENT
Start: 2019-03-01 | End: 2019-03-11

## 2019-03-01 NOTE — PROGRESS NOTES
Chief Complaint   Patient presents with   • Other     abcess       HISTORY OF PRESENT ILLNESS: Patient is a 38 y.o. female who presents today because she has a 2-3-day history of enlarging red tender nodule in her right axillary region.  She has not been applying any medication to the area or taking any medication for it but it is getting larger.    Patient Active Problem List    Diagnosis Date Noted   • Obesity (BMI 30-39.9) 01/02/2018       Allergies:Amoxicillin    Current Outpatient Prescriptions Ordered in [facility]   Medication Sig Dispense Refill   • sulfamethoxazole-trimethoprim (BACTRIM DS) 800-160 MG tablet Take 1 Tab by mouth 2 times a day for 10 days. 20 Tab 0   • fluticasone (FLONASE) 50 MCG/ACT nasal spray Spray 1 Spray in nose every day. 16 g 0   • oxymetazoline (AFRIN) 0.05 % Solution Spray 2 Sprays in nose 2 times a day. 1 Bottle 0   • Loratadine (CLARITIN PO) Take  by mouth.       No current Epic-ordered facility-administered medications on file.        No past medical history on file.    Social History   Substance Use Topics   • Smoking status: Never Smoker   • Smokeless tobacco: Never Used   • Alcohol use 1.0 oz/week     1 Glasses of wine, 1 Cans of beer per week      Comment: occ       No family status information on file.   No family history on file.    ROS:  Review of Systems   Constitutional: Negative for fever, chills, weight loss and malaise/fatigue.   HENT: Negative for ear pain, nosebleeds, congestion, sore throat and neck pain.    Eyes: Negative for blurred vision.   Respiratory: Negative for cough, sputum production, shortness of breath and wheezing.    Cardiovascular: Negative for chest pain, palpitations, orthopnea and leg swelling.   Gastrointestinal: Negative for heartburn, nausea, vomiting and abdominal pain.   Genitourinary: Negative for dysuria, urgency and frequency.     Exam:  Blood pressure 118/78, pulse 100, temperature 36.9 °C (98.5 °F), temperature source Temporal, resp. rate 14,  "height 1.575 m (5' 2\"), weight 97.5 kg (215 lb), SpO2 98 %, not currently breastfeeding.  General:  Well nourished, well developed female in NAD  Head:Normocephalic, atraumatic  Eyes: PERRLA, EOM within normal limits, no conjunctival injection, no scleral icterus, visual fields and acuity grossly intact.  Nose: Symmetrical without tenderness, no discharge.  Mouth: reasonable hygiene, no erythema exudates or tonsillar enlargement.  Neck: no masses, range of motion within normal limits, no tracheal deviation. No obvious thyroid enlargement.  Pulmonary: chest is symmetrical with respiration, no wheezes, crackles, or rhonchi.  Cardiovascular: regular rate and rhythm without murmurs, rubs, or gallops.  Extremities: no clubbing, cyanosis, or edema.  Skin: In her right axillary region there is a 2 cm diameter erythematous indurated nodule without fluctuance.  No drainage currently.      Please note that this dictation was created using voice recognition software. I have made every reasonable attempt to correct obvious errors, but I expect that there are errors of grammar and possibly content that I did not discover before finalizing the note.    Assessment/Plan:  1. Abscess of axilla, right  sulfamethoxazole-trimethoprim (BACTRIM DS) 800-160 MG tablet   Apply warm packs to the area, use Tylenol or ibuprofen as tolerated    Followup with primary care in the next 7-10 days if not significantly improving, return to the urgent care or go to the emergency room sooner for any worsening of symptoms.       "

## 2019-10-30 ENCOUNTER — OFFICE VISIT (OUTPATIENT)
Dept: URGENT CARE | Facility: PHYSICIAN GROUP | Age: 39
End: 2019-10-30
Payer: COMMERCIAL

## 2019-10-30 ENCOUNTER — APPOINTMENT (OUTPATIENT)
Dept: MEDICAL GROUP | Facility: PHYSICIAN GROUP | Age: 39
End: 2019-10-30
Payer: COMMERCIAL

## 2019-10-30 VITALS
BODY MASS INDEX: 39.01 KG/M2 | SYSTOLIC BLOOD PRESSURE: 118 MMHG | RESPIRATION RATE: 14 BRPM | HEART RATE: 104 BPM | OXYGEN SATURATION: 96 % | TEMPERATURE: 97.4 F | WEIGHT: 212 LBS | HEIGHT: 62 IN | DIASTOLIC BLOOD PRESSURE: 86 MMHG

## 2019-10-30 DIAGNOSIS — J01.90 ACUTE BACTERIAL SINUSITIS: ICD-10-CM

## 2019-10-30 DIAGNOSIS — M79.10 MYALGIA: ICD-10-CM

## 2019-10-30 DIAGNOSIS — B96.89 ACUTE BACTERIAL SINUSITIS: ICD-10-CM

## 2019-10-30 LAB
FLUAV+FLUBV AG SPEC QL IA: NEGATIVE
INT CON NEG: NEGATIVE
INT CON POS: POSITIVE

## 2019-10-30 PROCEDURE — 87804 INFLUENZA ASSAY W/OPTIC: CPT | Performed by: PHYSICIAN ASSISTANT

## 2019-10-30 PROCEDURE — 99214 OFFICE O/P EST MOD 30 MIN: CPT | Performed by: PHYSICIAN ASSISTANT

## 2019-10-30 RX ORDER — DOXYCYCLINE HYCLATE 100 MG
100 TABLET ORAL 2 TIMES DAILY
Qty: 20 TAB | Refills: 0 | Status: SHIPPED | OUTPATIENT
Start: 2019-10-30 | End: 2019-11-09

## 2019-10-30 NOTE — PROGRESS NOTES
Chief Complaint   Patient presents with   • Pharyngitis   • Fever       HISTORY OF PRESENT ILLNESS: Patient is a 39 y.o. female who presents today because she has an illness that started a little over week ago.  She has a sore throat and a mild cough for about a week, her sore throat has essentially resolved but over the last 1 to 2 days she has had significant myalgias, headaches behind her eyes and in the back of her head and general sick feeling.  She has been taking some over-the-counter Sudafed and Mucinex without improvement.  She reports fevers and she took Tylenol 2 hours ago because she was running a fever this morning.    Patient Active Problem List    Diagnosis Date Noted   • Obesity (BMI 30-39.9) 01/02/2018       Allergies:Amoxicillin    Current Outpatient Medications Ordered in Epic   Medication Sig Dispense Refill   • doxycycline (VIBRAMYCIN) 100 MG Tab Take 1 Tab by mouth 2 times a day for 10 days. 20 Tab 0   • fluticasone (FLONASE) 50 MCG/ACT nasal spray Spray 1 Spray in nose every day. 16 g 0   • oxymetazoline (AFRIN) 0.05 % Solution Spray 2 Sprays in nose 2 times a day. 1 Bottle 0   • Loratadine (CLARITIN PO) Take  by mouth.       No current Epic-ordered facility-administered medications on file.        No past medical history on file.    Social History     Tobacco Use   • Smoking status: Never Smoker   • Smokeless tobacco: Never Used   Substance Use Topics   • Alcohol use: Yes     Alcohol/week: 1.0 oz     Types: 1 Glasses of wine, 1 Cans of beer per week     Comment: occ   • Drug use: No       No family status information on file.   No family history on file.    ROS:  Review of Systems   Constitutional: Positive for fever, chills, myalgias and malaise/fatigue.   HENT: Negative for ear pain, nosebleeds, positive for nasal sinus congestion, resolved sore throat and no neck pain.    Eyes: Negative for blurred vision.   Respiratory: Positive for dry mild cough, no sputum production, shortness of  "breath and wheezing.    Cardiovascular: Negative for chest pain, palpitations, orthopnea and leg swelling.   Gastrointestinal: Negative for heartburn, nausea, vomiting and abdominal pain.   Genitourinary: Negative for dysuria, urgency and frequency.     Exam:  /86 (BP Location: Right arm, Patient Position: Sitting, BP Cuff Size: Large adult)   Pulse (!) 104   Temp 36.3 °C (97.4 °F) (Temporal)   Resp 14   Ht 1.575 m (5' 2\")   Wt 96.2 kg (212 lb)   SpO2 96%   General:  Well nourished, well developed female in NAD  Head:Normocephalic, atraumatic  Eyes: PERRLA, EOM within normal limits, no conjunctival injection, no scleral icterus, visual fields and acuity grossly intact.  Ears: Normal shape and symmetry, no tenderness, no discharge. External canals are without any significant edema or erythema. Tympanic membranes are without any inflammation, no effusion. Gross auditory acuity is intact  Nose: Symmetrical without tenderness, no discharge.  Nasal mucosa is erythematous excoriated bilaterally, worse on the right.  There is scabbing to the mucosa of the right nasal passage  Mouth: reasonable hygiene, no erythema exudates or tonsillar enlargement.  Neck: no masses, range of motion within normal limits, no tracheal deviation. No obvious thyroid enlargement.  Pulmonary: chest is symmetrical with respiration, no wheezes, crackles, or rhonchi.  Cardiovascular: Minimally tachycardic rate, regular rhythm without murmurs, rubs, or gallops.  Extremities: no clubbing, cyanosis, or edema.    Influenza AB test is negative    Please note that this dictation was created using voice recognition software. I have made every reasonable attempt to correct obvious errors, but I expect that there are errors of grammar and possibly content that I did not discover before finalizing the note.    Assessment/Plan:  1. Acute bacterial sinusitis  doxycycline (VIBRAMYCIN) 100 MG Tab   2. Myalgia  POCT Influenza A/B   Continue " over-the-counter decongestants as tolerated.  Continue anti-inflammatories as needed.    Followup with primary care in the next 7-10 days if not significantly improving, return to the urgent care or go to the emergency room sooner for any worsening of symptoms.

## 2019-12-18 ENCOUNTER — OFFICE VISIT (OUTPATIENT)
Dept: URGENT CARE | Facility: PHYSICIAN GROUP | Age: 39
End: 2019-12-18
Payer: COMMERCIAL

## 2019-12-18 VITALS
BODY MASS INDEX: 38.64 KG/M2 | DIASTOLIC BLOOD PRESSURE: 84 MMHG | TEMPERATURE: 97.1 F | HEART RATE: 108 BPM | RESPIRATION RATE: 16 BRPM | OXYGEN SATURATION: 99 % | HEIGHT: 62 IN | WEIGHT: 210 LBS | SYSTOLIC BLOOD PRESSURE: 122 MMHG

## 2019-12-18 DIAGNOSIS — J01.90 ACUTE RHINOSINUSITIS: ICD-10-CM

## 2019-12-18 DIAGNOSIS — J98.01 BRONCHOSPASM: ICD-10-CM

## 2019-12-18 DIAGNOSIS — J30.9 ALLERGIC RHINITIS, UNSPECIFIED SEASONALITY, UNSPECIFIED TRIGGER: ICD-10-CM

## 2019-12-18 PROCEDURE — 99214 OFFICE O/P EST MOD 30 MIN: CPT | Performed by: EMERGENCY MEDICINE

## 2019-12-18 RX ORDER — DEXAMETHASONE 4 MG/1
10 TABLET ORAL ONCE
Qty: 3 TAB | Refills: 0 | Status: SHIPPED | OUTPATIENT
Start: 2019-12-18 | End: 2019-12-18

## 2019-12-18 RX ORDER — DOXYCYCLINE HYCLATE 100 MG
100 TABLET ORAL 2 TIMES DAILY
Qty: 14 TAB | Refills: 0 | Status: SHIPPED | OUTPATIENT
Start: 2019-12-18

## 2019-12-18 ASSESSMENT — ENCOUNTER SYMPTOMS
SHORTNESS OF BREATH: 1
RHINORRHEA: 1
ROS SKIN COMMENTS: CHRONIC ECZEMA
SPUTUM PRODUCTION: 0
ABDOMINAL PAIN: 0
VOMITING: 0
SINUS PAIN: 1
COUGH: 1
HEADACHES: 0
SORE THROAT: 1
MYALGIAS: 0
WHEEZING: 0
HEMOPTYSIS: 0
DIARRHEA: 0

## 2019-12-18 NOTE — PROGRESS NOTES
Subjective:      Laurence Perez is a 39 y.o. female who presents with Cough            URI    This is a new problem. Episode onset: over 2 weeks. The problem has been rapidly worsening (last 3 days). There has been no fever. Associated symptoms include chest pain, congestion, coughing, a rash, rhinorrhea, sinus pain and a sore throat. Pertinent negatives include no abdominal pain, diarrhea, headaches, plugged ear sensation, vomiting or wheezing. She has tried antihistamine for the symptoms. The treatment provided no relief.   PMH AR, RAD    Review of Systems   HENT: Positive for congestion, nosebleeds, rhinorrhea, sinus pain and sore throat. Negative for hearing loss.    Respiratory: Positive for cough and shortness of breath. Negative for hemoptysis, sputum production and wheezing.         Notes spells of persistent coughing.   Cardiovascular: Positive for chest pain.        Anterior chest pain associated with coughing only   Gastrointestinal: Negative for abdominal pain, diarrhea and vomiting.   Musculoskeletal: Negative for myalgias.   Skin: Positive for rash.        Chronic eczema   Neurological: Negative for headaches.   Endo/Heme/Allergies: Positive for environmental allergies.     PMH:  has no past medical history on file.  MEDS:   Current Outpatient Medications:   •  Pseudoephedrine HCl (SUDAFED PO), Take  by mouth., Disp: , Rfl:   •  Albuterol Sulfate 108 (90 Base) MCG/ACT AEROSOL POWDER, BREATH ACTIVATED, Inhale 1-2 Puffs by mouth every 6 hours as needed., Disp: 1 Each, Rfl: 0  •  doxycycline (VIBRAMYCIN) 100 MG Tab, Take 1 Tab by mouth 2 times a day., Disp: 14 Tab, Rfl: 0  •  dexamethasone (DECADRON) 4 MG Tab, Take 2.5 Tabs by mouth Once for 1 dose., Disp: 3 Tab, Rfl: 0  •  fluticasone (FLONASE) 50 MCG/ACT nasal spray, Spray 1 Spray in nose every day., Disp: 16 g, Rfl: 0  •  Loratadine (CLARITIN PO), Take  by mouth., Disp: , Rfl:   •  oxymetazoline (AFRIN) 0.05 % Solution, Spray 2 Sprays in nose 2 times  "a day., Disp: 1 Bottle, Rfl: 0  ALLERGIES:   Allergies   Allergen Reactions   • Amoxicillin      SURGHX: History reviewed. No pertinent surgical history.  SOCHX:  reports that she has never smoked. She has never used smokeless tobacco. She reports current alcohol use of about 1.0 oz of alcohol per week. She reports that she does not use drugs.  FH: family history is not on file.     Objective:     /84 (BP Location: Right arm, Patient Position: Sitting, BP Cuff Size: Large adult)   Pulse (!) 108   Temp 36.2 °C (97.1 °F) (Temporal)   Resp 16   Ht 1.575 m (5' 2\")   Wt 95.3 kg (210 lb)   SpO2 99%   BMI 38.41 kg/m²      Physical Exam  Constitutional:       General: She is not in acute distress.     Appearance: She is well-developed. She is not ill-appearing.   HENT:      Right Ear: Tympanic membrane and ear canal normal.      Left Ear: Tympanic membrane and ear canal normal.      Nose: Mucosal edema present. No rhinorrhea.      Right Nostril: No epistaxis.      Left Nostril: No epistaxis.      Right Sinus: Maxillary sinus tenderness present. No frontal sinus tenderness.      Left Sinus: No maxillary sinus tenderness or frontal sinus tenderness.      Mouth/Throat:      Pharynx: Uvula midline.   Eyes:      Conjunctiva/sclera: Conjunctivae normal.   Neck:      Musculoskeletal: Neck supple.      Trachea: Trachea normal.   Cardiovascular:      Rate and Rhythm: Normal rate and regular rhythm.      Heart sounds: Normal heart sounds.   Pulmonary:      Effort: Pulmonary effort is normal.      Breath sounds: No decreased breath sounds, wheezing, rhonchi or rales.   Lymphadenopathy:      Cervical: No cervical adenopathy.   Skin:     General: Skin is warm and dry.   Neurological:      Mental Status: She is alert and oriented to person, place, and time.   Psychiatric:         Behavior: Behavior is cooperative.                 Assessment/Plan:       1. Acute rhinosinusitis  Based on duration, worsening:  - doxycycline " (VIBRAMYCIN) 100 MG Tab; Take 1 Tab by mouth 2 times a day.  Dispense: 14 Tab; Refill: 0  Recommended supportive care measures, including rest, increasing oral fluid intake and use of over-the-counter medications for relief of symptoms.  2. Bronchospasm  - Albuterol Sulfate 108 (90 Base) MCG/ACT AEROSOL POWDER, BREATH ACTIVATED; Inhale 1-2 Puffs by mouth every 6 hours as needed.  Dispense: 1 Each; Refill: 0    3. Allergic rhinitis, unspecified seasonality, unspecified trigger  Recommended nasal saline irrigation daily, OTC inhaled nasal steroid daily, OTC nonsedating antihistamine when necessary as tolerated.  - dexamethasone (DECADRON) 4 MG Tab; Take 2.5 Tabs by mouth Once for 1 dose.  Dispense: 3 Tab; Refill: 0

## 2020-02-29 ENCOUNTER — OFFICE VISIT (OUTPATIENT)
Dept: URGENT CARE | Facility: PHYSICIAN GROUP | Age: 40
End: 2020-02-29
Payer: COMMERCIAL

## 2020-02-29 VITALS
WEIGHT: 212 LBS | OXYGEN SATURATION: 98 % | BODY MASS INDEX: 39.01 KG/M2 | HEIGHT: 62 IN | SYSTOLIC BLOOD PRESSURE: 118 MMHG | HEART RATE: 88 BPM | RESPIRATION RATE: 12 BRPM | DIASTOLIC BLOOD PRESSURE: 82 MMHG | TEMPERATURE: 97.3 F

## 2020-02-29 DIAGNOSIS — L73.9 FOLLICULITIS OF BOTH AXILLAE: ICD-10-CM

## 2020-02-29 DIAGNOSIS — J30.9 ALLERGIC RHINITIS, UNSPECIFIED SEASONALITY, UNSPECIFIED TRIGGER: ICD-10-CM

## 2020-02-29 DIAGNOSIS — J02.9 SORE THROAT: ICD-10-CM

## 2020-02-29 LAB
INT CON NEG: NEGATIVE
INT CON POS: POSITIVE
S PYO AG THROAT QL: NEGATIVE

## 2020-02-29 PROCEDURE — 99214 OFFICE O/P EST MOD 30 MIN: CPT | Performed by: EMERGENCY MEDICINE

## 2020-02-29 PROCEDURE — 87880 STREP A ASSAY W/OPTIC: CPT | Performed by: EMERGENCY MEDICINE

## 2020-02-29 RX ORDER — DOXYCYCLINE HYCLATE 100 MG
100 TABLET ORAL 2 TIMES DAILY
Qty: 14 TAB | Refills: 0 | Status: SHIPPED | OUTPATIENT
Start: 2020-02-29

## 2020-02-29 ASSESSMENT — ENCOUNTER SYMPTOMS
SWOLLEN GLANDS: 0
HOARSE VOICE: 0
COUGH: 0

## 2020-02-29 NOTE — PROGRESS NOTES
Subjective:      Laurence Perez is a 39 y.o. female who presents with Pharyngitis            Pharyngitis    This is a new problem. The current episode started yesterday. The problem has been unchanged. Neither side of throat is experiencing more pain than the other. There has been no fever. The pain is mild. Associated symptoms include congestion. Pertinent negatives include no coughing, ear pain, hoarse voice, plugged ear sensation or swollen glands. She has had no exposure to strep.   Rash   This is a recurrent problem. The current episode started more than 1 month ago. The problem has been gradually worsening since onset. The affected locations include the left axilla and right axilla. The rash is characterized by redness, pain and burning. She was exposed to nothing. Associated symptoms include congestion. Pertinent negatives include no cough. Treatments tried: Bactrim. The treatment provided moderate relief. Her past medical history is significant for allergies and eczema.       Review of Systems   HENT: Positive for congestion. Negative for ear pain and hoarse voice.    Respiratory: Negative for cough.    Skin: Positive for rash.     History reviewed. No pertinent past medical history.   Allergy:  Amoxicillin     Current Outpatient Medications:   •  doxycycline, 100 mg, Oral, BID  •  Pseudoephedrine HCl (SUDAFED PO), Take  by mouth., Not Taking  •  Albuterol Sulfate, 1-2 Puff, Inhalation, Q6HRS PRN  •  doxycycline, 100 mg, Oral, BID  •  fluticasone, 1 Spray, Nasal, DAILY, Taking  •  oxymetazoline, 2 Spray, Nasal, BID  •  Loratadine (CLARITIN PO), Take  by mouth., Not Taking   family history is not on file.   Social History     Tobacco Use   • Smoking status: Never Smoker   • Smokeless tobacco: Never Used   Substance Use Topics   • Alcohol use: Yes     Alcohol/week: 1.0 oz     Types: 1 Glasses of wine, 1 Cans of beer per week     Comment: occ   • Drug use: No         Objective:     /82 (BP Location:  "Right arm, Patient Position: Sitting, BP Cuff Size: Large adult)   Pulse 88   Temp 36.3 °C (97.3 °F) (Temporal)   Resp 12   Ht 1.575 m (5' 2\")   Wt 96.2 kg (212 lb)   SpO2 98%   BMI 38.78 kg/m²      Physical Exam  Constitutional:       General: She is not in acute distress.     Appearance: She is well-developed. She is not toxic-appearing.   HENT:      Right Ear: Tympanic membrane and ear canal normal.      Left Ear: Tympanic membrane and ear canal normal.      Nose: No mucosal edema or rhinorrhea.      Mouth/Throat:      Pharynx: No oropharyngeal exudate, posterior oropharyngeal erythema or uvula swelling.      Tonsils: No tonsillar exudate.   Eyes:      Conjunctiva/sclera: Conjunctivae normal.   Neck:      Musculoskeletal: Neck supple.      Trachea: Trachea normal.   Cardiovascular:      Rate and Rhythm: Normal rate and regular rhythm.      Heart sounds: Normal heart sounds.   Pulmonary:      Effort: Pulmonary effort is normal.      Breath sounds: Normal breath sounds.   Lymphadenopathy:      Cervical: No cervical adenopathy.   Skin:     General: Skin is warm and dry.      Findings: Rash present. Rash is papular and pustular.      Comments: Bilateral axilla with scattered papulopustular lesions, right-sided peripheral to central axilla.  No abscess.   Neurological:      Mental Status: She is alert.   Psychiatric:         Behavior: Behavior is cooperative.                 Assessment/Plan:       1. Folliculitis of both axillae  Advised discontinue shaving areas.  Rx doxycycline    2. Allergic rhinitis, unspecified seasonality, unspecified trigger  Recommended nasal saline irrigation daily as needed, OTC inhaled nasal steroid daily, OTC nonsedating antihistamine when necessary as tolerated.    3. Sore throat  negative- POCT Rapid Strep A    "

## 2023-04-07 NOTE — PATIENT INSTRUCTIONS
Otalgia  Otalgia is pain in or around the ear. When the pain is from the ear itself it is called primary otalgia. Pain may also be coming from somewhere else, like the head and neck. This is called secondary otalgia.   CAUSES   Causes of primary otalgia include:  · Middle ear infection.  · It can also be caused by injury to the ear or infection of the ear canal (swimmer's ear). Swimmer's ear causes pain, swelling and often drainage from the ear canal.  Causes of secondary otalgia include:  · Sinus infections.  · Allergies and colds that cause stuffiness of the nose and tubes that drain the ears (eustachian tubes).  · Dental problems like cavities, gum infections or teething.  · Sore Throat (tonsillitis and pharyngitis).  · Swollen glands in the neck.  · Infection of the bone behind the ear (mastoiditis).  · TMJ discomfort (problems with the joint between your jaw and your skull).  · Other problems such as nerve disorders, circulation problems, heart disease and tumors of the head and neck can also cause symptoms of ear pain. This is rare.  DIAGNOSIS   Evaluation, Diagnosis and Testing:  · Examination by your medical caregiver is recommended to evaluate and diagnose the cause of otalgia.  · Further testing or referral to a specialist may be indicated if the cause of the ear pain is not found and the symptom persists.  TREATMENT   · Your doctor may prescribe antibiotics if an ear infection is diagnosed.  · Pain relievers and topical analgesics may be recommended.  · It is important to take all medications as prescribed.  HOME CARE INSTRUCTIONS   · It may be helpful to sleep with the painful ear in the up position.  · A warm compress over the painful ear may provide relief.  · A soft diet and avoiding gum may help while ear pain is present.  SEEK IMMEDIATE MEDICAL CARE IF:  · You develop severe pain, a high fever, repeated vomiting or dehydration.  · You develop extreme dizziness, headache, confusion, ringing in the  ears (tinnitus) or hearing loss.  Document Released: 01/25/2006 Document Revised: 03/11/2013 Document Reviewed: 10/27/2010  [employer]® Patient Information ©2014 [employer], [employer].     Patient's belongings returned